# Patient Record
Sex: MALE | Race: WHITE | NOT HISPANIC OR LATINO | Employment: FULL TIME | URBAN - METROPOLITAN AREA
[De-identification: names, ages, dates, MRNs, and addresses within clinical notes are randomized per-mention and may not be internally consistent; named-entity substitution may affect disease eponyms.]

---

## 2017-03-07 ENCOUNTER — CLINICAL SUPPORT (OUTPATIENT)
Dept: AUDIOLOGY | Facility: CLINIC | Age: 77
End: 2017-03-07
Payer: MEDICARE

## 2017-03-07 ENCOUNTER — OFFICE VISIT (OUTPATIENT)
Dept: OTOLARYNGOLOGY | Facility: CLINIC | Age: 77
End: 2017-03-07
Payer: MEDICARE

## 2017-03-07 VITALS
DIASTOLIC BLOOD PRESSURE: 66 MMHG | WEIGHT: 220.44 LBS | BODY MASS INDEX: 31.63 KG/M2 | SYSTOLIC BLOOD PRESSURE: 107 MMHG

## 2017-03-07 DIAGNOSIS — H90.3 SENSORINEURAL HEARING LOSS (SNHL) OF BOTH EARS: ICD-10-CM

## 2017-03-07 DIAGNOSIS — H90.3 SENSORINEURAL HEARING LOSS, BILATERAL: Primary | ICD-10-CM

## 2017-03-07 PROCEDURE — 99999 PR PBB SHADOW E&M-EST. PATIENT-LVL II: CPT | Mod: PBBFAC,,, | Performed by: OTOLARYNGOLOGY

## 2017-03-07 PROCEDURE — 99212 OFFICE O/P EST SF 10 MIN: CPT | Mod: PBBFAC,25 | Performed by: OTOLARYNGOLOGY

## 2017-03-07 PROCEDURE — 99203 OFFICE O/P NEW LOW 30 MIN: CPT | Mod: S$PBB,,, | Performed by: OTOLARYNGOLOGY

## 2017-03-07 PROCEDURE — 99999 PR PBB SHADOW E&M-EST. PATIENT-LVL I: CPT | Mod: PBBFAC,,,

## 2017-03-07 RX ORDER — TRAMADOL HYDROCHLORIDE 50 MG/1
50 TABLET ORAL
Refills: 0 | COMMUNITY
Start: 2017-01-05 | End: 2018-10-10

## 2017-03-07 RX ORDER — LISINOPRIL 10 MG/1
10 TABLET ORAL DAILY
Refills: 3 | COMMUNITY
Start: 2017-02-02

## 2017-03-07 RX ORDER — METRONIDAZOLE 7.5 MG/G
CREAM TOPICAL
Refills: 3 | COMMUNITY
Start: 2017-01-25 | End: 2018-10-10

## 2017-03-07 NOTE — PROGRESS NOTES
Subjective:       Patient ID: Denilson Cain is a 77 y.o. male.    Chief Complaint: No chief complaint on file.    HPI This patients presents with a chronic history (about 10 years in duration) of the above subjective complaint(s). He denies otalgia, otorrhea, vertigo, tinnitus, aural fullness/pressure, fluctuation of hearing, frequent middle ear infections, prior otologic surgery, antecedent URI's or excessive occupational/recreational noise exposure. There is also no history of ototoxic drug exposure. He has been wearing hearing aids binaurally for roughly 9 years and believes that his current fitting is still benefiting him.    Review of Systems   Constitutional: Negative for activity change, appetite change and fever.   HENT: Positive for hearing loss. Negative for congestion, ear discharge, ear pain, nosebleeds, postnasal drip, rhinorrhea and sneezing.    Eyes: Negative for redness and visual disturbance.   Respiratory: Negative for apnea, cough, shortness of breath and wheezing.    Cardiovascular: Negative for chest pain and palpitations.        HTN   Gastrointestinal: Negative for diarrhea and vomiting.   Genitourinary: Negative for difficulty urinating and frequency.   Musculoskeletal: Positive for arthralgias. Negative for back pain, gait problem and neck pain.   Skin: Negative for color change and rash.   Neurological: Positive for dizziness. Negative for speech difficulty, weakness and headaches.   Hematological: Negative for adenopathy. Does not bruise/bleed easily.   Psychiatric/Behavioral: Negative for agitation and behavioral problems.       Objective:        Constitutional:   Vital signs are normal. He appears well-developed and well-nourished. He is active. Normal speech.      Head:  Normocephalic and atraumatic. Salivary glands normal.  Facial strength is normal.      Ears:    Right Ear: Decreased hearing is noted.   Left Ear: Decreased hearing is noted.     Nose:  Nose normal including  turbinates, nasal mucosa, sinuses and nasal septum.     Mouth/Throat  Oropharynx clear and moist without lesions or asymmetry and normal uvula midline.     Neck:  Neck normal without thyromegaly masses, asymmetry, normal tracheal structure, crepitus, and tenderness, thyroid normal, trachea normal, phonation normal and full range of motion with neck supple.     Psychiatric:   He has a normal mood and affect. His speech is normal and behavior is normal.     Neurological:   He has neurological normal, alert and oriented.     Skin:   No abrasions, lacerations, lesions, or rashes.       As a result of this patients history and examination findings, a comprehensive audiogram was ordered to determine the level of hearing/hearing loss.            Assessment:       1. Sensorineural hearing loss (SNHL) of both ears        Plan:       1. Hearing conservation strongly recommended.  2. Trial of amplification bilaterally also recommended (patient should consider an upgraded pair of new devices).  3. Re-check of hearing in 18-24 months or sooner if subjective change noted.  4. F/U with PCP as per schedule.

## 2017-03-07 NOTE — MR AVS SNAPSHOT
[facility] - Otorhinolaryngology  [address]  [facility] [address]  Phone: [phone]  Fax: [phone]                  Denilson Cain   3/7/2017 3:00 PM   Office Visit    Description:  Male : 1940   Provider:  Javad Haque MD   Department:  [facility] - Otorhinolaryngology           Reason for Visit     Hearing Loss           Diagnoses this Visit        Comments    Sensorineural hearing loss (SNHL) of both ears                To Do List           Goals (5 Years of Data)     None      Follow-Up and Disposition     Return if symptoms worsen or fail to improve.      [facility]     [facility] On Call Nurse Care Line -  Assistance  Registered nurses in the [facility] On Call Center provide clinical advisement, health education, appointment booking, and other advisory services.  Call for this free service at [phone].             Medications           Message regarding Medications     Verify the changes and/or additions to your medication regime listed below are the same as discussed with your clinician today.  If any of these changes or additions are incorrect, please notify your healthcare provider.             Verify that the below list of medications is an accurate representation of the medications you are currently taking.  If none reported, the list may be blank. If incorrect, please contact your healthcare provider. Carry this list with you in case of emergency.           Current Medications     aspirin (ECOTRIN) 81 MG EC tablet Take 81 mg by mouth once daily. On hold for surgery 3-12-15.  Last taken 15.    lisinopril (PRINIVIL,ZESTRIL) 5 MG tablet Take 5 mg by mouth every evening.    lisinopril 10 MG tablet Take 10 mg by mouth once daily.    metronidazole 0.75% (METROCREAM) 0.75 % Crea APPLY TWICE A DAY TO AFFECTED AREA ON FACE    simvastatin (ZOCOR) 20 MG tablet Take 20 mg by mouth every evening.    tramadol (ULTRAM) 50 mg tablet Take 50 mg by mouth every 4 to 6 hours  as needed.           Clinical Reference Information           Your Vitals Were     BP Weight BMI          107/66 (BP Location: Right arm, Patient Position: Sitting, BP Method: Automatic) 100 kg (220 lb 7.4 oz) 31.63 kg/m2        Blood Pressure          Most Recent Value    BP  107/66      Allergies as of 3/7/2017     No Known Allergies      Immunizations Administered on Date of Encounter - 3/7/2017     None      [facility] Sign-Up     Activating your [facility] account is as easy as 1-2-3!     1) Visit [email or web address], select Sign Up Now, enter this activation code and your date of birth, then select Next.  [email or web address]  Expires: 4/21/2017  3:00 PM      2) Create a username and password to use when you visit [facility] in the future and select a security question in case you lose your password and select Next.    3) Enter your e-mail address and click Sign Up!    Additional Information  If you have questions, please e-mail [email or web address] or call [phone] to talk to our [facility] staff. Remember, [facility] is NOT to be used for urgent needs. For medical emergencies, dial 911.         Language Assistance Services     ATTENTION: Language assistance services are available, free of charge. Please call [phone].      ATENCIÓN: Si habla español, tiene a copeland disposición servicios gratuitos de asistencia lingüística. Llame al [phone].     CHÚ Ý: N?u b?n nói Ti?ng Vi?t, có các d?ch v? h? tr? ngôn ng? mi?n phí dành cho b?n. G?i s? [phone].         [facility] complies with applicable Federal civil rights laws and does not discriminate on the basis of race, color, national origin, age, disability, or sex.

## 2017-03-07 NOTE — PROGRESS NOTES
Audiological Evaluation:    Test results indicated a moderate to severe SNHL AD in the high frequencies and a moderate SNHL in the low and high frequency range in the left ear with good speech discrimination scores bilaterally.  Mr. Cain currently has bilateral, Widex CIC hearing aids which he no longer receives benefit from.  Recommend:  1.  Otologic evaluation  2.  Annual hearing evaluations  3.  Noise protection  4.  HAC

## 2017-03-08 ENCOUNTER — CLINICAL SUPPORT (OUTPATIENT)
Dept: AUDIOLOGY | Facility: CLINIC | Age: 77
End: 2017-03-08

## 2017-03-08 DIAGNOSIS — H90.3 SENSORINEURAL HEARING LOSS, BILATERAL: Primary | ICD-10-CM

## 2017-03-08 PROCEDURE — 99499 UNLISTED E&M SERVICE: CPT | Mod: S$GLB,,, | Performed by: OTOLARYNGOLOGY

## 2017-03-08 NOTE — PROGRESS NOTES
Denilson Cain was seen in the clinic today for a hearing aid consult.    Pricing and styles were discussed. Brothneil Acosta decided to order a pair of Phonak Shweebeo B50-R hearing aids in P7 with #2 standard receivers and medium open domes for the right ear and medium closed domes for the left ear. The contract was signed and Brother Dave was given a copy.     An appointment was scheduled for Brother Dave to return to the clinic to  the hearing aids.

## 2017-03-15 ENCOUNTER — CLINICAL SUPPORT (OUTPATIENT)
Dept: AUDIOLOGY | Facility: CLINIC | Age: 77
End: 2017-03-15

## 2017-03-15 DIAGNOSIS — H90.3 SENSORINEURAL HEARING LOSS, BILATERAL: Primary | ICD-10-CM

## 2017-03-15 NOTE — PROGRESS NOTES
Denilson Cain was seen in the clinic today to  his Phonak Audeo B50-R hearing aids in graphite gray with #2 standard receivers and a medium open dome on the right hearing aid and a medium closed dome on the left hearing aid.     Acclimatization was set to 90%. Whistleblock was enabled. MPO was increased. Brother Dave was pleased with how the hearing aids sounded. The alerts were demonstrated. The push button was disabled. Brother Dave was shown how to properly place the hearing aids on the  and turn them on/off. Proper insertion/removal as well as care and maintenance were also reviewed.     A 2 week follow-up appointment was scheduled. Brother Dave was encouraged to call the clinic if he needed to be seen sooner.

## 2017-03-27 ENCOUNTER — CLINICAL SUPPORT (OUTPATIENT)
Dept: AUDIOLOGY | Facility: CLINIC | Age: 77
End: 2017-03-27

## 2017-03-27 DIAGNOSIS — H90.3 SENSORINEURAL HEARING LOSS, BILATERAL: Primary | ICD-10-CM

## 2017-03-27 PROCEDURE — 99499 UNLISTED E&M SERVICE: CPT | Mod: S$GLB,,, | Performed by: OTOLARYNGOLOGY

## 2017-03-27 NOTE — PROGRESS NOTES
Denilson Cain was seen in the clinic today for a hearing aid follow-up.    Brother Dave reported that for almost the first 2 weeks he had forgotten to turn the hearing aids on before putting them in his ears. He stated today was the first day he had actually worn the hearing aids on. He stated he felt the left ear was slightly softer than the right. His left hearing aid was increased 3 dB for the 65 dB input range. Brother Dave was pleased with the sound of the hearing aids. Care and maintenance were also re- reviewed.    Brother Dave will call to update me with how he's doing with the hearing aids. He will schedule a follow-up appointment as needed.

## 2017-04-07 ENCOUNTER — CLINICAL SUPPORT (OUTPATIENT)
Dept: AUDIOLOGY | Facility: CLINIC | Age: 77
End: 2017-04-07

## 2017-04-07 DIAGNOSIS — H90.3 SENSORINEURAL HEARING LOSS, BILATERAL: Primary | ICD-10-CM

## 2017-04-07 PROCEDURE — 99499 UNLISTED E&M SERVICE: CPT | Mod: S$GLB,,, | Performed by: OTOLARYNGOLOGY

## 2017-04-07 NOTE — PROGRESS NOTES
Denilson Cain was seen in the clinic today for a hearing aid follow-up.    Brother Dave reported his right hearing was not working. His filter was plugged with wax. Both filters were replaced. Care and maintenance were reviewed. He was counseled that he may have to change his filters more frequently due to excessive wax build up. Acclimatization was increased to 100%.    Brother Dave will call the clinic for a follow-up appointment as needed.

## 2017-05-04 ENCOUNTER — CLINICAL SUPPORT (OUTPATIENT)
Dept: AUDIOLOGY | Facility: CLINIC | Age: 77
End: 2017-05-04
Payer: MEDICARE

## 2017-05-04 ENCOUNTER — INITIAL CONSULT (OUTPATIENT)
Dept: OTOLARYNGOLOGY | Facility: CLINIC | Age: 77
End: 2017-05-04
Payer: MEDICARE

## 2017-05-04 ENCOUNTER — CLINICAL SUPPORT (OUTPATIENT)
Dept: AUDIOLOGY | Facility: CLINIC | Age: 77
End: 2017-05-04

## 2017-05-04 VITALS
TEMPERATURE: 98 F | BODY MASS INDEX: 32.13 KG/M2 | HEIGHT: 70 IN | DIASTOLIC BLOOD PRESSURE: 66 MMHG | WEIGHT: 224.44 LBS | SYSTOLIC BLOOD PRESSURE: 126 MMHG | HEART RATE: 70 BPM

## 2017-05-04 DIAGNOSIS — H90.3 ASYMMETRICAL SENSORINEURAL HEARING LOSS: Primary | ICD-10-CM

## 2017-05-04 DIAGNOSIS — Z97.4 WEARS HEARING AID: ICD-10-CM

## 2017-05-04 DIAGNOSIS — H91.92 LOW FREQUENCY HEARING LOSS OF LEFT EAR: ICD-10-CM

## 2017-05-04 DIAGNOSIS — R42 VERTIGO: ICD-10-CM

## 2017-05-04 PROCEDURE — 99213 OFFICE O/P EST LOW 20 MIN: CPT | Mod: PBBFAC,25,27 | Performed by: OTOLARYNGOLOGY

## 2017-05-04 PROCEDURE — 99213 OFFICE O/P EST LOW 20 MIN: CPT | Mod: S$PBB,,, | Performed by: OTOLARYNGOLOGY

## 2017-05-04 PROCEDURE — 99999 PR PBB SHADOW E&M-EST. PATIENT-LVL III: CPT | Mod: PBBFAC,,, | Performed by: OTOLARYNGOLOGY

## 2017-05-04 PROCEDURE — 99999 PR PBB SHADOW E&M-EST. PATIENT-LVL I: CPT | Mod: PBBFAC,,,

## 2017-05-04 PROCEDURE — 99499 UNLISTED E&M SERVICE: CPT | Mod: S$GLB,,, | Performed by: OTOLARYNGOLOGY

## 2017-05-04 RX ORDER — MECLIZINE HYDROCHLORIDE 25 MG/1
TABLET ORAL
COMMUNITY
Start: 2017-04-30 | End: 2018-10-10

## 2017-05-04 NOTE — PROGRESS NOTES
Denilson Cain was seen in the clinic today for a hearing evaluation. Brother Dave reported sudden onset vertigo 4/30/2017. He currently wears bilateral hearing aids.     Audiological testing revealed a mild to severe high frequency sensorineural hearing loss in the right ear and a moderate rising to mild sloping to severe sensorineural hearing loss in the left ear. There was a slight decrease in Brother Dave's hearing in the left ear since his last evaluation.  A speech reception threshold was obtained at 15 dBHL in the right ear and 30 dBHL in the left ear. Speech discrimination was 96% in the right ear and 88% in the left ear.    Recommendations:  1. Otologic evaluation  2. Annual hearing evaluation  3. Continued use of hearing aids

## 2017-05-04 NOTE — PROGRESS NOTES
Denilson Cifuentesnicolas was seen in the clinic today for a hearing aid follow-up.    Brother Dave saw Dr. Perez this morning due to sudden onset vertigo 4/30/2017. An audiogram was repeated and the hearing in his left ear had slightly decreased. Today's audiogram was input into [employer] and the hearing aids were reprogrammed. Brother Dave was pleased with the sound of the hearing aids.    He will call the clinic for a follow-up appointment as needed.

## 2017-05-04 NOTE — PATIENT INSTRUCTIONS
Audiometry reviewed: slight decrease in AS hearing  comapred to 3/7/17 study  AS hearing aid adjustment per DINA Witt today  Meniere's literature provided  Rx for dyazide #7; take one for fluid; may repeat( watch blood pressure)   Rx for prednisone 20 mg # 7; take 1 BID x 2 days and 1 daily x 2 days and 1/2 x 2 days all with food prn change in hearing  Judicious ise of meclizine discussed; may substitute diazepam 2 mg prn  RTC 3 weeks ' repeat AS audiometry

## 2017-05-04 NOTE — MR AVS SNAPSHOT
[facility] - Otorhinolaryngology  [address]  [facility] [address]  Phone: [phone]  Fax: [phone]                  Denilson Cain   2017 8:45 AM   Initial consult    Description:  Male : 1940   Provider:  Jaya Perez III, MD   Department:  [facility] - Otorhinolaryngology           Diagnoses this Visit        Comments    Asymmetrical hearing loss of both ears    -  Primary     Low frequency hearing loss of left ear         Wears hearing aid     bilateral     Vertigo     to ED  17           To Do List           Future Appointments        Provider Department Dept Phone    2017 8:15 AM Jaya Perez III, MD [facility] Otorhinolaryngology [phone]      Goals (5 Years of Data)     None      [facility]     [facility] On Call Nurse Care Line -  Assistance  Unless otherwise directed by your provider, please contact [facility], our nurse care line that is available for  assistance.     Registered nurses in the [facility] On Call Center provide: appointment scheduling, clinical advisement, health education, and other advisory services.  Call: [phone] (toll free)               Medications           Message regarding Medications     Verify the changes and/or additions to your medication regime listed below are the same as discussed with your clinician today.  If any of these changes or additions are incorrect, please notify your healthcare provider.             Verify that the below list of medications is an accurate representation of the medications you are currently taking.  If none reported, the list may be blank. If incorrect, please contact your healthcare provider. Carry this list with you in case of emergency.           Current Medications     aspirin (ECOTRIN) 81 MG EC tablet Take 81 mg by mouth once daily. On hold for surgery 315.  Last taken 215.    lisinopril (PRINIVIL,ZESTRIL) 5 MG tablet Take 5 mg by mouth every evening.     "lisinopril 10 MG tablet Take 10 mg by mouth once daily.    meclizine (ANTIVERT) 25 mg tablet     metronidazole 0.75% (METROCREAM) 0.75 % Crea APPLY TWICE A DAY TO AFFECTED AREA ON FACE    simvastatin (ZOCOR) 20 MG tablet Take 20 mg by mouth every evening.    tramadol (ULTRAM) 50 mg tablet Take 50 mg by mouth every 4 to 6 hours as needed.           Clinical Reference Information           Your Vitals Were     BP Pulse Temp    126/66 (BP Location: Left arm, Patient Position: Sitting, BP Method: Automatic) 70 98.3 °F (36.8 °C) (Tympanic)    Height Weight BMI    5' 10" (1.778 m) 101.8 kg (224 lb 6.9 oz) 32.2 kg/m2      Blood Pressure          Most Recent Value    BP  126/66      Allergies as of 5/4/2017     No Known Allergies      Immunizations Administered on Date of Encounter - 5/4/2017     None      [facility] Sign-Up     Activating your [facility] account is as easy as 1-2-3!     1) Visit [email or web address], select Sign Up Now, enter this activation code and your date of birth, then select Next.  [email or web address]  Expires: 6/20/2017 11:06 AM      2) Create a username and password to use when you visit [facility] in the future and select a security question in case you lose your password and select Next.    3) Enter your e-mail address and click Sign Up!    Additional Information  If you have questions, please e-mail [email or web address] or call [phone] to talk to our [facility] staff. Remember, [facility] is NOT to be used for urgent needs. For medical emergencies, dial 911.         Instructions    Audiometry reviewed: slight decrease in AS hearing  comapred to 3/7/17 study  AS hearing aid adjustment per DINA Witt today  Meniere's literature provided  Rx for dyazide #7; take one for fluid; may repeat( watch blood pressure)   Rx for prednisone 20 mg # 7; take 1 BID x 2 days and 1 daily x 2 days and 1/2 x 2 days all with food prn change in hearing  Judicious ise of meclizine discussed; sandeep substitute diazepam 2 mg " prn  RTC 3 weeks ' repeat AS audiometry         Language Assistance Services     ATTENTION: Language assistance services are available, free of charge. Please call [phone].      ATENCIÓN: Si habla kristy, tiene a copeland disposición servicios gratuitos de asistencia lingüística. Llame al [phone].     CHÚ Ý: N?u b?n nói Ti?ng Vi?t, có các d?ch v? h? tr? ngôn ng? mi?n phí dành cho b?n. G?i s? [phone].         [facility] complies with applicable Federal civil rights laws and does not discriminate on the basis of race, color, national origin, age, disability, or sex.

## 2017-05-06 NOTE — PROGRESS NOTES
CC.  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 Javad Haque 3/7/17 relatively recently.  The EMR notes indicate the patient's did not of otalgia, otorrhea, vertigo, tinnitus, aural fullness or pressure or hearing fluctuation or history of ear infections in the past.  He denied antecedent URI symptoms and no history of ototoxic drug exposure.  He had worn hearing aids binaurally for roughly 9 years prior to that visit.  He was diagnosed with a sensorineural hearing loss of both ears.  His audiometric study is duplicated below.  Is also completed another audiometric study today scheduled by me.  His chief complaint is dizziness symptoms which occurred about 5 days ago.  He indicated some right ear pain or pressure symptoms for the dizziness.  Even took his hearing aid out due to discomfort for a while.  He shows me's paperwork from his ED visit [facility] dated 4/30/17.  The patient had awakening at 1 in the morning with a spinning vertiginous sensation lasted 30 minutes.  He thought he was having a stroke.  He is treated with IV fluids for 2-1/2 hours in the ED and then discharged with meclizine 25 mg tablets.    A CT scan of the head was performed which was within normal limits by his recollection.  He feels fine now.    He walks with a cane; he is status post a left knee replacement procedure.    He wears bilateral Phonak hearing aids presently..    I have  duplicated today's audiogram as well as the study performed 3/7/17            PE: Blood pressure 126/66 pulse 70 temperature 98.3 height 5 feet 10 inches weight 224 pounds  Gen.: Alert and oriented gentleman in no acute distress  Both ears were examined under the microscope and the micro-procedure room.  Cerumen was extracted from the right ear canal manually.  The right eardrum is intact and clear as visualized directly in the right middle ear space  appears aerated.  A small amount of cerumen was extracted from the left  ear canal.  Left eardrum is intact and clear; the  left middle ear space appears well aerated.  Nasal exam reveals an anterior inferior septal deflection.  There is no evidence of purulent discharge in either passage.  Oropharyngeal exam reveals mild inflammation of the pharynx laterally.  The uvula is not sore edematous.  4 mouth is supple.  Neck examination is within normal limits.    DIAGNOSIS:     ICD-10-CM ICD-9-CM    1. Asymmetrical hearing loss of both ears H91.8X3 389.8    2. Low frequency hearing loss of left ear H91.92 389.8    3. Wears hearing aid Z97.4 V45.89     bilateral    4. Vertigo R42 780.4     to ED 4/30 17     PLAN: Audiometry reviewed: slight decrease in AS hearing  comapred to 3/7/17 study  AS hearing aid adjustment per DINA Witt today  Meniere's literature provided  Rx for dyazide #7; take one for fluid; may repeat( watch blood pressure)   Rx for prednisone 20 mg # 7; take 1 BID x 2 days and 1 daily x 2 days and 1/2 x 2 days all with food prn change in hearing  Judicious ise of meclizine discussed; may substitute diazepam 2 mg prn  RTC 3 weeks ' repeat AS audiometry

## 2017-05-25 ENCOUNTER — OFFICE VISIT (OUTPATIENT)
Dept: OTOLARYNGOLOGY | Facility: CLINIC | Age: 77
End: 2017-05-25
Payer: MEDICARE

## 2017-05-25 ENCOUNTER — CLINICAL SUPPORT (OUTPATIENT)
Dept: AUDIOLOGY | Facility: CLINIC | Age: 77
End: 2017-05-25
Payer: MEDICARE

## 2017-05-25 VITALS
DIASTOLIC BLOOD PRESSURE: 75 MMHG | HEART RATE: 66 BPM | WEIGHT: 227.31 LBS | SYSTOLIC BLOOD PRESSURE: 136 MMHG | BODY MASS INDEX: 32.61 KG/M2

## 2017-05-25 DIAGNOSIS — R42 VERTIGO: Primary | ICD-10-CM

## 2017-05-25 DIAGNOSIS — H90.3 ASYMMETRICAL SENSORINEURAL HEARING LOSS: Primary | ICD-10-CM

## 2017-05-25 PROCEDURE — 99999 PR PBB SHADOW E&M-EST. PATIENT-LVL I: CPT | Mod: PBBFAC,,,

## 2017-05-25 PROCEDURE — 99213 OFFICE O/P EST LOW 20 MIN: CPT | Mod: S$PBB,,, | Performed by: OTOLARYNGOLOGY

## 2017-05-25 PROCEDURE — 99999 PR PBB SHADOW E&M-EST. PATIENT-LVL III: CPT | Mod: PBBFAC,,, | Performed by: OTOLARYNGOLOGY

## 2017-05-25 PROCEDURE — 92567 TYMPANOMETRY: CPT | Mod: 52,PBBFAC | Performed by: AUDIOLOGIST

## 2017-05-25 PROCEDURE — 99211 OFF/OP EST MAY X REQ PHY/QHP: CPT | Mod: PBBFAC,25

## 2017-05-25 PROCEDURE — 92557 COMPREHENSIVE HEARING TEST: CPT | Mod: 52,PBBFAC | Performed by: AUDIOLOGIST

## 2017-05-25 NOTE — PROGRESS NOTES
Brother Ant was seen in the clinic today for a left hearing evaluation.      Audiological testing revealed a moderately severe rising to normal sloping to severe SNHL. A speech reception threshold was obtained at 35 dBHL. Speech discrimination was 56%.    Tympanometry revealed Type A tympanogram.    Recommendations:  1. Otologic evaluation  2. Annual hearing evaluation  3. Noise protection  4. Continued use of hearing aids

## 2017-05-25 NOTE — PROGRESS NOTES
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  He has to Dyazide pills (completed the oral prednisone course.  He does not feel worse with his left ear hearing is not improved significantly.  He was slightly dizzy this past Saturday.  He has been restricting salt from his diet.  He wears hearing aids.  He been examined by Dr. Tu Haque in early March of this year prior to his vertigo symptomatology.    He had been evaluated and treated the [facility] emergency room for vertigo symptoms 4/30/17.  He awoke at 1:00 in the morning experienced a spinning vertiginous sensation that lasted 30 minutes.  He was treated with IV fluids and then discharged from the ED with meclizine tablets.    A CT scan of the head was performed which was within normal limits.    The patient completed an audiometric study performed by the [facility] audiology service.    The study is duplicated below and compared to previous audiometric study.  I make note of improvement in some of theAS  pure-tone responses posttreatment but there is evidence of diminution in the left ear discrimination score.         PSH: Knee surgery with prosthesis.    PE: Blood pressure 136/75 pulse 66 height 5 feet 10 inches weight 227 pounds  Gen.: Alert and oriented gentleman in no acute distress; patient denies ankle or pedal edema.  Both ears were examined under the microscope.  There is no evidence of fluid or infection visualized behind either intact clear eardrum.   Nasal and oropharyngeal exams are unremarkable and unchanged from the previous status.  Neck examination is within normal  limits.  DIAGNOSIS:     ICD-10-CM ICD-9-CM    1. Vertigo R42 780.4 MRI Brain W WO Contrast      Creatinine, serum   2. Asymmetrical hearing loss of both ears H91.8X3 389.8 MRI Brain W WO Contrast      Creatinine, serum     PLAN: AS audiometry reviewed; improvement in pure tone responses; decrease in discrimination  Low sodium diet encouraged; refer to instruction sheets prn  VNG testing discussed; not ordered today  MRI brai/IACS ordered  Use of anti vertiginous medication discussed  Meclizine 25 mg # 25 re-prescribed ; take q 6 hours prn vertigo  RTC prn

## 2017-05-25 NOTE — PATIENT INSTRUCTIONS
AS audiometry reviewed; improvement in pure tone responses; decrease in discrimination  Low sodium diet encouraged; refer to instruction sheets prn  VNG testing discussed; not ordered today  MRI brain/IACS ordered  Use of anti vertiginous medication discussed  Meclizine 25 mg # 25 re-prescribed ; take q 6 hours prn vertigo  RTC prn

## 2017-05-26 ENCOUNTER — LAB VISIT (OUTPATIENT)
Dept: LAB | Facility: HOSPITAL | Age: 77
End: 2017-05-26
Attending: OTOLARYNGOLOGY
Payer: MEDICARE

## 2017-05-26 DIAGNOSIS — R42 VERTIGO: ICD-10-CM

## 2017-05-26 LAB
CREAT SERPL-MCNC: 0.9 MG/DL
EST. GFR  (AFRICAN AMERICAN): >60 ML/MIN/1.73 M^2
EST. GFR  (NON AFRICAN AMERICAN): >60 ML/MIN/1.73 M^2

## 2017-05-26 PROCEDURE — 36415 COLL VENOUS BLD VENIPUNCTURE: CPT

## 2017-05-26 PROCEDURE — 82565 ASSAY OF CREATININE: CPT

## 2017-06-08 ENCOUNTER — HOSPITAL ENCOUNTER (OUTPATIENT)
Dept: RADIOLOGY | Facility: HOSPITAL | Age: 77
Discharge: HOME | End: 2017-06-08
Attending: OTOLARYNGOLOGY
Payer: MEDICARE

## 2017-06-08 DIAGNOSIS — R42 VERTIGO: ICD-10-CM

## 2017-06-08 PROCEDURE — 63600175 PHARM REV CODE 636 W HCPCS: Performed by: STUDENT IN AN ORGANIZED HEALTH CARE EDUCATION/TRAINING PROGRAM

## 2017-06-08 PROCEDURE — 25500020 PHARM REV CODE 255: Performed by: OTOLARYNGOLOGY

## 2017-06-08 PROCEDURE — 70553 MRI BRAIN STEM W/O & W/DYE: CPT | Mod: 26,,, | Performed by: RADIOLOGY

## 2017-06-08 PROCEDURE — A9585 GADOBUTROL INJECTION: HCPCS | Performed by: OTOLARYNGOLOGY

## 2017-06-08 PROCEDURE — 70553 MRI BRAIN STEM W/O & W/DYE: CPT | Mod: TC

## 2017-06-08 RX ORDER — GADOBUTROL 604.72 MG/ML
10 INJECTION INTRAVENOUS
Status: COMPLETED | OUTPATIENT
Start: 2017-06-08 | End: 2017-06-08

## 2017-06-08 RX ORDER — MIDAZOLAM HYDROCHLORIDE 1 MG/ML
2 INJECTION, SOLUTION INTRAMUSCULAR; INTRAVENOUS ONCE
Status: COMPLETED | OUTPATIENT
Start: 2017-06-08 | End: 2017-06-08

## 2017-06-08 RX ADMIN — GADOBUTROL 10 ML: 604.72 INJECTION INTRAVENOUS at 04:06

## 2017-06-08 RX ADMIN — MIDAZOLAM HYDROCHLORIDE 2 MG: 1 INJECTION, SOLUTION INTRAMUSCULAR; INTRAVENOUS at 01:06

## 2017-06-08 NOTE — PROGRESS NOTES
2mg of Versed administered prior to MRI for anxiolysis. Pt denies any allergies and has a ride home.

## 2017-07-05 ENCOUNTER — TELEPHONE (OUTPATIENT)
Dept: OTOLARYNGOLOGY | Facility: CLINIC | Age: 77
End: 2017-07-05

## 2017-10-11 ENCOUNTER — CLINICAL SUPPORT (OUTPATIENT)
Dept: AUDIOLOGY | Facility: CLINIC | Age: 77
End: 2017-10-11
Payer: MEDICARE

## 2017-10-11 ENCOUNTER — OFFICE VISIT (OUTPATIENT)
Dept: OTOLARYNGOLOGY | Facility: CLINIC | Age: 77
End: 2017-10-11
Payer: MEDICARE

## 2017-10-11 VITALS — BODY MASS INDEX: 32.32 KG/M2 | WEIGHT: 225.75 LBS | HEIGHT: 70 IN

## 2017-10-11 DIAGNOSIS — H81.02 ENDOLYMPHATIC HYDROPS OF LEFT EAR: Primary | ICD-10-CM

## 2017-10-11 DIAGNOSIS — H81.8X9 OTHER DISORDERS OF VESTIBULAR FUNCTION, UNSPECIFIED EAR: Primary | ICD-10-CM

## 2017-10-11 DIAGNOSIS — H90.3 SENSORINEURAL HEARING LOSS, BILATERAL: Primary | ICD-10-CM

## 2017-10-11 PROCEDURE — 99999 PR PBB SHADOW E&M-EST. PATIENT-LVL III: CPT | Mod: PBBFAC,,, | Performed by: OTOLARYNGOLOGY

## 2017-10-11 PROCEDURE — 92550 TYMPANOMETRY & REFLEX THRESH: CPT | Mod: PBBFAC | Performed by: AUDIOLOGIST

## 2017-10-11 PROCEDURE — 92540 BASIC VESTIBULAR EVALUATION: CPT | Mod: PBBFAC | Performed by: AUDIOLOGIST

## 2017-10-11 PROCEDURE — 99213 OFFICE O/P EST LOW 20 MIN: CPT | Mod: PBBFAC | Performed by: OTOLARYNGOLOGY

## 2017-10-11 PROCEDURE — 99214 OFFICE O/P EST MOD 30 MIN: CPT | Mod: S$PBB,,, | Performed by: OTOLARYNGOLOGY

## 2017-10-11 PROCEDURE — 92540 BASIC VESTIBULAR EVALUATION: CPT | Mod: 26,S$PBB,, | Performed by: AUDIOLOGIST

## 2017-10-11 PROCEDURE — 92557 COMPREHENSIVE HEARING TEST: CPT | Mod: PBBFAC | Performed by: AUDIOLOGIST

## 2017-10-11 PROCEDURE — 92537 CALORIC VSTBLR TEST W/REC: CPT | Mod: 26,S$PBB,, | Performed by: AUDIOLOGIST

## 2017-10-11 PROCEDURE — 92537 CALORIC VSTBLR TEST W/REC: CPT | Mod: PBBFAC | Performed by: AUDIOLOGIST

## 2017-10-11 NOTE — PROGRESS NOTES
Subjective:       Patient ID: Denilson Cain is a 77 y.o. male.    Chief Complaint: Dizziness    HPI: Ref Dr HOANG.    Had some episodic dizz with ?? LF HL over summer.    Better now.    Not sure related.    No sx's for 2 mos.    Rx'd with Low Na+ diet/ HCTZ.    Off now.    MRI Neg.    VNG WNL.    Hearing @ Baseline.    Past Medical History: Patient has a past medical history of Arthritis; Cancer; Hearing loss; Hyperlipidemia; and Hypertension.    Past Surgical History: Patient has a past surgical history that includes Hernia repair; Laparoscopic gastric banding (2006); Coronary angioplasty with stent (2001); hernia repairs; coronary artery stent (2006); and left knee replacement (2016).    Social History: Patient reports that he has never smoked. He has never used smokeless tobacco. He reports that he does not drink alcohol or use drugs.    Family History: family history is not on file.    Medications:   Current Outpatient Prescriptions   Medication Sig    aspirin (ECOTRIN) 81 MG EC tablet Take 81 mg by mouth once daily. On hold for surgery 3-12-15.  Last taken 2-9-15.    lisinopril (PRINIVIL,ZESTRIL) 5 MG tablet Take 5 mg by mouth every evening.    lisinopril 10 MG tablet Take 10 mg by mouth once daily.    meclizine (ANTIVERT) 25 mg tablet     metronidazole 0.75% (METROCREAM) 0.75 % Crea APPLY TWICE A DAY TO AFFECTED AREA ON FACE    simvastatin (ZOCOR) 20 MG tablet Take 20 mg by mouth every evening.    tramadol (ULTRAM) 50 mg tablet Take 50 mg by mouth every 4 to 6 hours as needed.     No current facility-administered medications for this visit.        Allergies: Patient has No Known Allergies.    Review of Systems   Constitutional: Negative.    HENT: Positive for hearing loss and tinnitus. Negative for ear discharge and ear pain.    Neurological: Positive for dizziness. Negative for seizures, syncope, facial asymmetry, speech difficulty, weakness, light-headedness and headaches.       Objective:       Physical Exam   Constitutional: He appears well-developed and well-nourished. No distress.   HENT:   Head: Normocephalic and atraumatic.   Right Ear: No lacerations. No drainage, swelling or tenderness. No foreign bodies. No mastoid tenderness. Tympanic membrane is not injected, not scarred, not perforated, not erythematous, not retracted and not bulging. Tympanic membrane mobility is normal. No middle ear effusion. No hemotympanum. No decreased hearing is noted.   Left Ear: No lacerations. No drainage, swelling or tenderness. No foreign bodies. No mastoid tenderness. Tympanic membrane is not injected, not scarred, not perforated, not erythematous, not retracted and not bulging. Tympanic membrane mobility is normal.  No middle ear effusion. No hemotympanum. Decreased hearing is noted.   Mouth/Throat: Oropharynx is clear and moist. No oropharyngeal exudate.       CN I-XII nl.    EOM's nl.    TF's nl.    Gait: Nl.    VNG: Nl..       Eyes: Pupils are equal, round, and reactive to light. Right eye exhibits normal extraocular motion and no nystagmus. Left eye exhibits normal extraocular motion and no nystagmus.   Neck: Normal range of motion.   Neurological: He is alert. He has normal strength. He displays no atrophy and no tremor. No cranial nerve deficit or sensory deficit. He exhibits normal muscle tone. He displays a negative Romberg sign. He displays no seizure activity. Coordination and gait normal.   Skin: He is not diaphoretic.                   Assessment:       1. Endolymphatic hydrops of left ear        Plan:         Low Na+ Diet.    Prn HCTZ.    F/U with me prn.

## 2017-10-11 NOTE — PROGRESS NOTES
VNG/Postuography Evaluation    Referring physician:  Dr. Braga    77 y.o. male complains of vertigo. Nausea, asymmetrical hearing loss.  Symptoms occurred spontaneously and have been 5 isolated episodes over the past 4-5 months.    Gaze nystagmus  was absent.  Oculomotor function was normal.  Spontaneous nystagmus was absent  The head-hanging left Hallpike revealed minimal and clinically insignificant (2 d/s) left-beating nystagmus: without vertigo.  The head-hanging right Hallpike revealed minimal and clinically insignificant (4 d/s) right-beating nystagmus: without vertigo that persisted on return to upright (4 d/s).  Unilateral weakness: 2% ( right)  Directional preponderance 2% (right beating)  RC: 23 d/s   d/s  RW: 30 d/s  LW: 30 d/s  Fixation suppression was normal.    Impression: Today's VNG is within normal limits.  There is no evidence of inner ear asymmetry nor dysfunction including BPPV, however, the presence of clinically insignificant static positional nystagmus suggests a previous non-localized vestibular insult that is incompletely compensated.    Recommend A trial with Cawthorne exercises.  The patient was provided with a printed copy of the exercises for use at home.

## 2018-02-28 ENCOUNTER — CLINICAL SUPPORT (OUTPATIENT)
Dept: AUDIOLOGY | Facility: CLINIC | Age: 78
End: 2018-02-28

## 2018-02-28 DIAGNOSIS — H90.3 SENSORINEURAL HEARING LOSS, BILATERAL: Primary | ICD-10-CM

## 2018-02-28 PROCEDURE — 99499 UNLISTED E&M SERVICE: CPT | Mod: S$GLB,,, | Performed by: OTOLARYNGOLOGY

## 2018-02-28 NOTE — PROGRESS NOTES
Denilson Mealtheanicolas was seen in the clinic today for a hearing aid follow-up.    Brother Dave reported the right hearing aid sounded weak. The hearing aids were cleaned. Brother Dave was shown how to brush off the microphones. Overall gain was increased 3 dB in the right hearing aid. Brother Dave was pleased with how the hearing aids sounded.    He will call the clinic for a follow-up appointment as needed.

## 2018-10-10 ENCOUNTER — CLINICAL SUPPORT (OUTPATIENT)
Dept: AUDIOLOGY | Facility: CLINIC | Age: 78
End: 2018-10-10
Payer: MEDICARE

## 2018-10-10 ENCOUNTER — OFFICE VISIT (OUTPATIENT)
Dept: OTOLARYNGOLOGY | Facility: CLINIC | Age: 78
End: 2018-10-10
Payer: MEDICARE

## 2018-10-10 VITALS — WEIGHT: 229.94 LBS | BODY MASS INDEX: 32.92 KG/M2 | HEIGHT: 70 IN

## 2018-10-10 DIAGNOSIS — T16.2XXA FOREIGN BODY OF LEFT EAR, INITIAL ENCOUNTER: Primary | ICD-10-CM

## 2018-10-10 DIAGNOSIS — H90.3 SENSORINEURAL HEARING LOSS, BILATERAL: Primary | ICD-10-CM

## 2018-10-10 DIAGNOSIS — H81.02 ENDOLYMPHATIC HYDROPS OF LEFT EAR: ICD-10-CM

## 2018-10-10 PROCEDURE — 69200 CLEAR OUTER EAR CANAL: CPT | Mod: S$PBB,LT,, | Performed by: OTOLARYNGOLOGY

## 2018-10-10 PROCEDURE — 69200 CLEAR OUTER EAR CANAL: CPT | Mod: PBBFAC | Performed by: OTOLARYNGOLOGY

## 2018-10-10 PROCEDURE — 99999 PR PBB SHADOW E&M-EST. PATIENT-LVL III: CPT | Mod: PBBFAC,,, | Performed by: OTOLARYNGOLOGY

## 2018-10-10 PROCEDURE — 99499 UNLISTED E&M SERVICE: CPT | Mod: S$GLB,,, | Performed by: OTOLARYNGOLOGY

## 2018-10-10 PROCEDURE — 99213 OFFICE O/P EST LOW 20 MIN: CPT | Mod: PBBFAC | Performed by: OTOLARYNGOLOGY

## 2018-10-10 RX ORDER — GABAPENTIN 300 MG/1
300 CAPSULE ORAL 3 TIMES DAILY
COMMUNITY

## 2018-10-10 NOTE — PROGRESS NOTES
Denilson Cain was seen in the clinic today following cerumne removal by Dr. Braga. Brother Dave reported the dome got stuck in his ear again.     Ear mold impressions were taken and a pair of cshells will be ordered for Brother Dave. I will call him when we receive the cshells to return to the clinic to  the cshells and adjust the hearing aids.

## 2018-10-10 NOTE — PROGRESS NOTES
Subjective:       Patient ID: Denilson Cain is a 78 y.o. male.    Chief Complaint: Foreign Body in Ear    HPI:   Previously seen for dizziness, here today after piece of hearing aid stuck in left ear.     Past Medical History: Patient has a past medical history of Arthritis, Cancer, Hearing loss, Hyperlipidemia, and Hypertension.    Past Surgical History: Patient has a past surgical history that includes Hernia repair; Laparoscopic gastric banding (2006); Coronary angioplasty with stent (2001); hernia repairs; coronary artery stent (2006); left knee replacement (2016); EXCISION-BASAL-CELL-CARCINOMA, FOREHEAD (Right, 3/12/2015); and SUTURE-SOFT TISSUE (Right, 3/12/2015).    Social History: Patient reports that  has never smoked. he has never used smokeless tobacco. He reports that he does not drink alcohol or use drugs.    Family History: family history is not on file.    Medications:   Current Outpatient Medications   Medication Sig    gabapentin (NEURONTIN) 300 MG capsule Take 300 mg by mouth 3 (three) times daily.    aspirin (ECOTRIN) 81 MG EC tablet Take 81 mg by mouth once daily. On hold for surgery 3-12-15.  Last taken 2-9-15.    lisinopril 10 MG tablet Take 10 mg by mouth once daily.    simvastatin (ZOCOR) 20 MG tablet Take 20 mg by mouth every evening.     No current facility-administered medications for this visit.        Allergies: Patient has No Known Allergies.    Review of Systems   Constitutional: Negative.    HENT: Positive for hearing loss and tinnitus. Negative for ear discharge and ear pain.    Neurological: Positive for dizziness. Negative for seizures, syncope, facial asymmetry, speech difficulty, weakness, light-headedness and headaches.       Objective:          Procedure Note:    The patient was brought to the minor procedure room and placed under the operating microscope. Using  cup forceps the patient's remaining part of hearing aid was removed. The tympanic membrane was evaluated and  was unremarkable. The patient tolerated the procedure well. There were no complications.        Assessment:       1. Foreign body of left ear, initial encounter    2. Endolymphatic hydrops of left ear        Plan:               Low Na+ Diet.    Prn HCTZ.     F/U with prn.

## 2018-10-19 ENCOUNTER — CLINICAL SUPPORT (OUTPATIENT)
Dept: AUDIOLOGY | Facility: CLINIC | Age: 78
End: 2018-10-19

## 2018-10-19 DIAGNOSIS — H90.3 SENSORINEURAL HEARING LOSS, BILATERAL: Primary | ICD-10-CM

## 2018-10-19 PROCEDURE — 99499 UNLISTED E&M SERVICE: CPT | Mod: S$GLB,,, | Performed by: OTOLARYNGOLOGY

## 2018-10-19 NOTE — PROGRESS NOTES
Brothneil Acosta was seen in the clinic today to  his cShells.     The right cshell was more difficult to insert than the left mold however Brother Dave was pleased with how they felt in his ears. The hearing aids were reprogrammed with the cShells. Brother Dave was informed of the remake period. He will try the right shell and if he has difficulty or discomfort with the mold he will let me know so we can take a new impression.    [employer]  [employer] cShells   length: 2  Serial numbers (right/left): [id number]  Warranty expiration: 02/14/2019

## 2018-11-13 ENCOUNTER — DOCUMENTATION ONLY (OUTPATIENT)
Dept: AUDIOLOGY | Facility: CLINIC | Age: 78
End: 2018-11-13

## 2018-11-13 NOTE — PROGRESS NOTES
Nemo Weston B50-R  [employer]  Purchase date: 3/9/17  Lt SN [id number]  Rt SN [id number]  Service Warranty Exp 6/6/20

## 2019-02-05 ENCOUNTER — DOCUMENTATION ONLY (OUTPATIENT)
Dept: AUDIOLOGY | Facility: CLINIC | Age: 79
End: 2019-02-05

## 2019-02-05 NOTE — PROGRESS NOTES
Hearing Aid Details;    Patient brought hearing aid in to be checked. He stated that the right aid was always going in and out. I showed him the filter that was full of wax and reminded him to change the wax filter every few weeks.  Right hearing aid was cleaned and tested    [employer] B50-R  [employer]  Purchase date: 3/9/17  Lt SN   [id number]  Rt SN   [id number]  Service Warranty Exp 6/6/20

## 2019-03-01 ENCOUNTER — HOSPITAL ENCOUNTER (EMERGENCY)
Facility: HOSPITAL | Age: 79
Discharge: HOME | End: 2019-03-01
Attending: EMERGENCY MEDICINE
Payer: MEDICARE

## 2019-03-01 VITALS
DIASTOLIC BLOOD PRESSURE: 58 MMHG | SYSTOLIC BLOOD PRESSURE: 112 MMHG | OXYGEN SATURATION: 95 % | TEMPERATURE: 98 F | RESPIRATION RATE: 24 BRPM | HEART RATE: 66 BPM

## 2019-03-01 DIAGNOSIS — R07.89 CHEST WALL PAIN: ICD-10-CM

## 2019-03-01 DIAGNOSIS — J06.9 UPPER RESPIRATORY TRACT INFECTION, UNSPECIFIED TYPE: Primary | ICD-10-CM

## 2019-03-01 LAB
ALBUMIN SERPL-MCNC: 3.8 G/DL (ref 3.3–5.5)
ALP SERPL-CCNC: 80 U/L (ref 42–141)
BILIRUB SERPL-MCNC: 0.5 MG/DL (ref 0.2–1.6)
BILIRUBIN, POC UA: ABNORMAL
BLOOD, POC UA: NEGATIVE
BUN SERPL-MCNC: 21 MG/DL (ref 7–22)
CALCIUM SERPL-MCNC: 8.7 MG/DL (ref 8–10.3)
CHLORIDE SERPL-SCNC: 104 MMOL/L (ref 98–108)
CLARITY, POC UA: CLEAR
COLOR, POC UA: ABNORMAL
CREAT SERPL-MCNC: 1 MG/DL (ref 0.6–1.2)
GLUCOSE SERPL-MCNC: 189 MG/DL (ref 73–118)
GLUCOSE, POC UA: ABNORMAL
KETONES, POC UA: ABNORMAL
LEUKOCYTE EST, POC UA: NEGATIVE
NITRITE, POC UA: NEGATIVE
PH UR STRIP: 5.5 [PH]
POC ALT (SGPT): 25 U/L (ref 10–47)
POC AST (SGOT): 32 U/L (ref 11–38)
POC B-TYPE NATRIURETIC PEPTIDE: 96.7 PG/ML (ref 0–100)
POC CARDIAC TROPONIN I: 0 NG/ML
POC PTINR: 1 (ref 0.9–1.2)
POC PTWBT: 12.3 SEC (ref 9.7–14.3)
POC TCO2: 28 MMOL/L (ref 18–33)
POTASSIUM BLD-SCNC: 4.2 MMOL/L (ref 3.6–5.1)
PROTEIN, POC UA: ABNORMAL
PROTEIN, POC: 6.3 G/DL (ref 6.4–8.1)
SAMPLE: NORMAL
SAMPLE: NORMAL
SODIUM BLD-SCNC: 140 MMOL/L (ref 128–145)
SPECIFIC GRAVITY, POC UA: 1.02
UROBILINOGEN, POC UA: 1 E.U./DL

## 2019-03-01 PROCEDURE — 84484 ASSAY OF TROPONIN QUANT: CPT | Mod: ER

## 2019-03-01 PROCEDURE — 93010 EKG 12-LEAD: ICD-10-PCS | Mod: ,,, | Performed by: INTERNAL MEDICINE

## 2019-03-01 PROCEDURE — 99285 EMERGENCY DEPT VISIT HI MDM: CPT | Mod: 25,ER

## 2019-03-01 PROCEDURE — 81003 URINALYSIS AUTO W/O SCOPE: CPT | Mod: ER

## 2019-03-01 PROCEDURE — 85610 PROTHROMBIN TIME: CPT | Mod: ER

## 2019-03-01 PROCEDURE — 25000003 PHARM REV CODE 250: Mod: ER | Performed by: EMERGENCY MEDICINE

## 2019-03-01 PROCEDURE — 93010 ELECTROCARDIOGRAM REPORT: CPT | Mod: ,,, | Performed by: INTERNAL MEDICINE

## 2019-03-01 PROCEDURE — 85025 COMPLETE CBC W/AUTO DIFF WBC: CPT | Mod: ER

## 2019-03-01 PROCEDURE — 83880 ASSAY OF NATRIURETIC PEPTIDE: CPT | Mod: ER

## 2019-03-01 PROCEDURE — 80053 COMPREHEN METABOLIC PANEL: CPT | Mod: ER

## 2019-03-01 PROCEDURE — 93005 ELECTROCARDIOGRAM TRACING: CPT | Mod: ER

## 2019-03-01 RX ORDER — BENZONATATE 200 MG/1
200 CAPSULE ORAL 3 TIMES DAILY PRN
Qty: 30 CAPSULE | Refills: 0 | Status: SHIPPED | OUTPATIENT
Start: 2019-03-01 | End: 2019-03-01 | Stop reason: CLARIF

## 2019-03-01 RX ORDER — ESOMEPRAZOLE MAGNESIUM 10 MG/1
10 GRANULE, FOR SUSPENSION, EXTENDED RELEASE ORAL
COMMUNITY

## 2019-03-01 RX ORDER — CETIRIZINE HYDROCHLORIDE 10 MG/1
10 TABLET ORAL DAILY PRN
Qty: 30 TABLET | Refills: 0 | Status: SHIPPED | OUTPATIENT
Start: 2019-03-01 | End: 2019-03-31

## 2019-03-01 RX ORDER — CETIRIZINE HYDROCHLORIDE 10 MG/1
10 TABLET ORAL DAILY PRN
Qty: 30 TABLET | Refills: 0 | Status: SHIPPED | OUTPATIENT
Start: 2019-03-01 | End: 2019-03-01 | Stop reason: CLARIF

## 2019-03-01 RX ORDER — FLUTICASONE PROPIONATE 50 MCG
1 SPRAY, SUSPENSION (ML) NASAL DAILY
Qty: 15 G | Refills: 0 | Status: SHIPPED | OUTPATIENT
Start: 2019-03-01

## 2019-03-01 RX ORDER — CETIRIZINE HYDROCHLORIDE 10 MG/1
10 TABLET ORAL DAILY PRN
Qty: 30 TABLET | Refills: 0 | COMMUNITY
Start: 2019-03-01 | End: 2019-03-01 | Stop reason: SDUPTHER

## 2019-03-01 RX ORDER — BENZONATATE 200 MG/1
200 CAPSULE ORAL 3 TIMES DAILY PRN
Qty: 30 CAPSULE | Refills: 0 | Status: SHIPPED | OUTPATIENT
Start: 2019-03-01 | End: 2019-03-11

## 2019-03-01 RX ORDER — ASPIRIN 325 MG
325 TABLET ORAL
Status: COMPLETED | OUTPATIENT
Start: 2019-03-01 | End: 2019-03-01

## 2019-03-01 RX ORDER — AMOXICILLIN AND CLAVULANATE POTASSIUM 875; 125 MG/1; MG/1
1 TABLET, FILM COATED ORAL 2 TIMES DAILY
Qty: 14 TABLET | Refills: 0 | Status: SHIPPED | OUTPATIENT
Start: 2019-03-01 | End: 2019-03-01 | Stop reason: CLARIF

## 2019-03-01 RX ADMIN — ASPIRIN 325 MG ORAL TABLET 325 MG: 325 PILL ORAL at 11:03

## 2019-03-01 NOTE — ED PROVIDER NOTES
Encounter Date: 3/1/2019    SCRIBE #1 NOTE: I, Melvi Mera, am scribing for, and in the presence of,  GILLES Enriquez. I have scribed the following portions of the note - Other sections scribed: HPI, ROS, PE.       History   No chief complaint on file.    This is a 79 year old male complaining of cough x 3 days. Patient is experiencing minor congestion, but denies fever. He has not had no relief with over the counter medication.       The history is provided by the patient.   Cough   This is a new problem. The current episode started several days ago (3 days). The problem has been unchanged. There has been no fever. Pertinent negatives include no chest pain, no chills, no sweats, no weight loss, no ear congestion, no ear pain, no headaches, no rhinorrhea, no sore throat, no myalgias, no shortness of breath, no wheezing and no eye redness.     Review of patient's allergies indicates:  No Known Allergies  Past Medical History:   Diagnosis Date    Arthritis     Lt knee    Cancer     Basal cell of face    Hearing loss     Hyperlipidemia     Hypertension      Past Surgical History:   Procedure Laterality Date    CORONARY ANGIOPLASTY WITH STENT PLACEMENT  2001    coronary artery stent  2006    EXCISION-BASAL-CELL-CARCINOMA, FOREHEAD Right 3/12/2015    Performed by Thong Du [facility] at [facility]    HERNIA REPAIR      x3  bilateral inguinal    hernia repairs      3 inguinal repairs since the 1980's    LAPAROSCOPIC GASTRIC BANDING  2006    left knee replacement  2016    SUTURE-SOFT TISSUE Right 3/12/2015    Performed by Thong Du MD at [facility] OR     No family history on file.  Social History     Tobacco Use    Smoking status: Never Smoker    Smokeless tobacco: Never Used   Substance Use Topics    Alcohol use: No    Drug use: No     Review of Systems   Constitutional: Negative.  Negative for chills, fever and weight loss.   HENT: Positive for congestion. Negative for ear pain, rhinorrhea and sore  throat.    Eyes: Negative.  Negative for redness.   Respiratory: Positive for cough. Negative for shortness of breath and wheezing.    Cardiovascular: Negative.  Negative for chest pain.   Gastrointestinal: Negative.    Endocrine: Negative.    Genitourinary: Negative.    Musculoskeletal: Negative.  Negative for myalgias.   Skin: Negative.    Allergic/Immunologic: Negative.    Neurological: Negative.  Negative for headaches.   Hematological: Negative.    Psychiatric/Behavioral: Negative.    All other systems reviewed and are negative.      Physical Exam     Initial Vitals [03/01/19 0954]   BP Pulse Resp Temp SpO2   (!) 109/57 71 18 98 °F (36.7 °C) 95 %      MAP       --         Physical Exam    Nursing note and vitals reviewed.  Constitutional: He appears well-developed and well-nourished.   HENT:   Head: Normocephalic and atraumatic.   Right Ear: External ear normal.   Left Ear: External ear normal.   Nose: Nose normal.   Mouth/Throat: Oropharynx is clear and moist.   Eyes: Conjunctivae are normal.   Neck: Normal range of motion. Neck supple.   Cardiovascular: Normal rate, regular rhythm, normal heart sounds and intact distal pulses. Exam reveals no gallop and no friction rub.    No murmur heard.  Pulmonary/Chest: Effort normal and breath sounds normal. No respiratory distress. He has no wheezes. He has no rhonchi. He has no rales.   Abdominal: Soft. There is no tenderness.   Musculoskeletal: Normal range of motion. He exhibits no edema or tenderness.   Neurological: He is alert and oriented to person, place, and time.   Skin: Skin is warm and dry. Capillary refill takes less than 2 seconds. No rash noted.   Psychiatric: He has a normal mood and affect. His behavior is normal.         ED Course   Procedures  Labs Reviewed   POCT URINALYSIS W/O SCOPE - Abnormal; Notable for the following components:       Result Value    Glucose, UA Trace (*)     Bilirubin, UA 1+ (*)     Ketones, UA 1+ (*)     Blood, UA Negative (*)      Protein, UA 1+ (*)     Nitrite, UA Negative (*)     Leukocytes, UA Negative (*)     All other components within normal limits   POCT CMP - Abnormal; Notable for the following components:    POC Glucose 189 (*)     Protein 6.3 (*)     All other components within normal limits   TROPONIN ISTAT   POCT CBC   POCT URINALYSIS W/O SCOPE   POCT CMP   POCT PROTIME-INR   POCT TROPONIN   POCT B-TYPE NATRIURETIC PEPTIDE (BNP)   ISTAT PROCEDURE   POCT B-TYPE NATRIURETIC PEPTIDE (BNP)     EKG Readings: (Independently Interpreted)   Initial Reading: No STEMI. Previous EKG: Compared with most recent EKG Previous EKG Date: When compared to prior EKG March 9, 2015 rate has increased by 7 beats per minute today. Rhythm: Normal Sinus Rhythm. Heart Rate: Sixty-eight. Axis: Left Axis Deviation. Other Impression: Normal EKG, QTC normal at 425   Normal sinus rhythm       Imaging Results          X-Ray Chest PA And Lateral (Final result)  Result time 03/01/19 11:07:42    Final result by Ramón Grace DO (03/01/19 11:07:42)                 Impression:      Please see above      Electronically signed by: Ramón Grace DO  Date:    03/01/2019  Time:    11:07             Narrative:    EXAMINATION:  XR CHEST PA AND LATERAL    CLINICAL HISTORY:  cough;    TECHNIQUE:  PA and lateral views of the chest were performed.    COMPARISON:  None    FINDINGS:  Horizontal bandlike opacity in the left lower lobe suggestive for scarring or atelectasis.  There is no large lung consolidation.  No pleural effusion or pneumothorax.  Borderline cardiomegaly.  Atherosclerotic aorta.  Degenerative change visualized spine.  Questioned percutaneous gastrostomy tube projecting over the left upper abdomen.  Further evaluation as warranted clinically..                                 Medical Decision Making:   Initial Assessment:   79 year old male complaining of cough and minor congestion x 3 days.  Patient denies fever chills. Patient reports chest pains with  coughing.  Differential Diagnosis:   Upper respiratory infection, acute sinusitis, acute bronchitis, pneumonia  Clinical Tests:   Lab Tests: Ordered and Reviewed       <> Summary of Lab: CBC- WBC 6.1, hemoglobin 12.5, hematocrit 36.7, platelet 223  Troponin 0  CMP- sodium 140, potassium 4.2, BUN 21 creatinine 1.0  ED Management:  Patient discharged home with Tessalon Perles, Zyrtec, Flonase.  Follow-up with PCP in 3-4 days.  Return ED for worsening of symptoms.            Scribe Attestation:   Scribe #1: I performed the above scribed service and the documentation accurately describes the services I performed. I attest to the accuracy of the note.    Attending Attestation:     Physician Attestation Statement for NP/PA:   I have conducted a face to face encounter with this patient in addition to the NP/PA, due to Medical Complexity    Other NP/PA Attestation Additions:    History of Present Illness: This a 79 year old male complaining of chest discomfort with nonproductive cough x 3 days.    Physical Exam: Post nasal drip noted.   Regular rate.   Regular rhythm.   Oropharynx normal.           I, Dr. Ruth Cota, personally performed the services described in this documentation. This document was produced by a scribe under my direction and in my presence. All medical record entries made by the scribe were at my direction and in my presence.  I have reviewed the chart and agree that the record reflects my personal performance and is accurate and complete. Ruth Cota, .     03/01/2019 1:19 PM             Clinical Impression:     1. Upper respiratory tract infection, unspecified type    2. Chest wall pain                                   GILLES May  03/01/19 4541

## 2019-03-01 NOTE — ED NOTES
Lab had an issue running some of the labwork. Lab is currently rerunning the labs needed and should result shortly. Pt updated on poc. Awaiting further orders.

## 2020-01-10 ENCOUNTER — CLINICAL SUPPORT (OUTPATIENT)
Dept: AUDIOLOGY | Facility: CLINIC | Age: 80
End: 2020-01-10
Payer: MEDICARE

## 2020-01-10 DIAGNOSIS — H90.3 SENSORINEURAL HEARING LOSS, BILATERAL: Primary | ICD-10-CM

## 2020-01-10 PROCEDURE — 99499 NO LOS: ICD-10-PCS | Mod: S$PBB,,, | Performed by: AUDIOLOGIST-HEARING AID FITTER

## 2020-01-10 PROCEDURE — 92557 COMPREHENSIVE HEARING TEST: CPT | Mod: PBBFAC | Performed by: AUDIOLOGIST-HEARING AID FITTER

## 2020-01-10 PROCEDURE — 99499 UNLISTED E&M SERVICE: CPT | Mod: S$PBB,,, | Performed by: AUDIOLOGIST-HEARING AID FITTER

## 2020-01-10 NOTE — PROGRESS NOTES
Denilson Cain was seen in the clinic today for a hearing evaluation. He has a history of fluctuating hearing loss in the left ear. Brother Dave's last hearing test was in 2017. He currently wears bilateral hearing aid he purchased in 2017.      Otoscopy was unremarkable. Audiological testing revealed a mild to severe mid to high frequency sensorineural hearing loss in the right ear and a mild to moderately severe sensorineural hearing loss in the left ear. A speech reception threshold was obtained at 15 dBHL in the right ear and 25 dBHL in the left ear. Speech recognition was 84% in the right ear and 80% in the left ear.    Recommendations:  1. Otologic evaluation  2. Annual hearing evaluation  3. Continued use of hearing aids

## 2020-01-10 NOTE — PROGRESS NOTES
Denilson Cain was seen in the clinic today for a hearing aid follow-up.    Brother Dave reported he had not been wearing the hearing aids over the last 2 months since he had hand surgery as he was unable to get them in his ears. The hearing aids were cleaned and a listening check revealed that the hearing aids were weak. Since the hearing aids are still under warranty we will send them in for repair. We will call Brother Dave when the hearing aids come in and set up a 60 minute follow-up appointment.

## 2020-01-24 ENCOUNTER — DOCUMENTATION ONLY (OUTPATIENT)
Dept: AUDIOLOGY | Facility: CLINIC | Age: 80
End: 2020-01-24

## 2020-01-24 NOTE — PROGRESS NOTES
Received in warranty repair from [employer]  Action Taken:  Hearing aids -- replaced electronics and housing  cShells -- replaced reaceiver and tubing for        [employer] Enrico EVANS0-R  [employer]  Purchase date: 3/9/17  Lt SN   [id number]  cShell SN [id number]  Rt SN   [id number]  cShell SN [id number]  Service Warranty Exp 6/6/20

## 2020-01-27 ENCOUNTER — CLINICAL SUPPORT (OUTPATIENT)
Dept: AUDIOLOGY | Facility: CLINIC | Age: 80
End: 2020-01-27
Payer: MEDICARE

## 2020-01-27 DIAGNOSIS — H90.3 SENSORINEURAL HEARING LOSS, BILATERAL: Primary | ICD-10-CM

## 2020-01-27 PROCEDURE — 99499 UNLISTED E&M SERVICE: CPT | Mod: S$PBB,,, | Performed by: AUDIOLOGIST-HEARING AID FITTER

## 2020-01-27 PROCEDURE — 99499 NO LOS: ICD-10-PCS | Mod: S$PBB,,, | Performed by: AUDIOLOGIST-HEARING AID FITTER

## 2020-01-27 NOTE — PROGRESS NOTES
Denilson Cifuentesnicolas was seen in the clinic today to  his repaired hearing aids.     The hearing aids were reprogrammed to NAL- NL 2. Brothneil Acosta was pleased with how the hearing aids sounded. We practiced insertion/removal as he recently had hand surgery. Brother Dave felt comfortable inserting and removing both hearing aids. He mentioned he would be relocating to [place] in the summer. He was given the number to contact [facility] to find a provider in his area.    Brother Dave will call the clinic for a follow-up appointment as needed.